# Patient Record
Sex: FEMALE | Race: WHITE | NOT HISPANIC OR LATINO | Employment: FULL TIME | URBAN - METROPOLITAN AREA
[De-identification: names, ages, dates, MRNs, and addresses within clinical notes are randomized per-mention and may not be internally consistent; named-entity substitution may affect disease eponyms.]

---

## 2017-11-30 ENCOUNTER — OFFICE VISIT (OUTPATIENT)
Dept: OBSTETRICS AND GYNECOLOGY | Facility: CLINIC | Age: 37
End: 2017-11-30
Payer: COMMERCIAL

## 2017-11-30 ENCOUNTER — LAB VISIT (OUTPATIENT)
Dept: LAB | Facility: HOSPITAL | Age: 37
End: 2017-11-30
Attending: OBSTETRICS & GYNECOLOGY
Payer: COMMERCIAL

## 2017-11-30 VITALS
DIASTOLIC BLOOD PRESSURE: 74 MMHG | BODY MASS INDEX: 22.79 KG/M2 | HEIGHT: 70 IN | WEIGHT: 159.19 LBS | SYSTOLIC BLOOD PRESSURE: 122 MMHG

## 2017-11-30 DIAGNOSIS — N92.6 IRREGULAR MENSTRUAL CYCLE: ICD-10-CM

## 2017-11-30 DIAGNOSIS — Z01.419 ENCOUNTER FOR GYNECOLOGICAL EXAMINATION WITHOUT ABNORMAL FINDING: Primary | ICD-10-CM

## 2017-11-30 LAB — PROGEST SERPL-MCNC: 0.3 NG/ML

## 2017-11-30 PROCEDURE — 84144 ASSAY OF PROGESTERONE: CPT

## 2017-11-30 PROCEDURE — 36415 COLL VENOUS BLD VENIPUNCTURE: CPT

## 2017-11-30 PROCEDURE — 82672 ASSAY OF ESTROGEN: CPT

## 2017-11-30 PROCEDURE — 88142 CYTOPATH C/V THIN LAYER: CPT

## 2017-11-30 PROCEDURE — 99999 PR PBB SHADOW E&M-EST. PATIENT-LVL III: CPT | Mod: PBBFAC,,, | Performed by: OBSTETRICS & GYNECOLOGY

## 2017-11-30 PROCEDURE — 83001 ASSAY OF GONADOTROPIN (FSH): CPT

## 2017-11-30 PROCEDURE — 99395 PREV VISIT EST AGE 18-39: CPT | Mod: S$GLB,,, | Performed by: OBSTETRICS & GYNECOLOGY

## 2017-11-30 RX ORDER — NORETHINDRONE ACETATE AND ETHINYL ESTRADIOL .02; 1 MG/1; MG/1
1 TABLET ORAL DAILY
Qty: 30 TABLET | Refills: 11 | Status: SHIPPED | OUTPATIENT
Start: 2017-11-30 | End: 2018-11-30

## 2017-11-30 RX ORDER — MELOXICAM 7.5 MG/1
TABLET ORAL
COMMUNITY
Start: 2017-11-29

## 2017-11-30 RX ORDER — SODIUM, POTASSIUM,MAG SULFATES 17.5-3.13G
SOLUTION, RECONSTITUTED, ORAL ORAL
COMMUNITY
Start: 2017-11-10

## 2017-11-30 RX ORDER — CYCLOBENZAPRINE HCL 5 MG
TABLET ORAL
Refills: 0 | COMMUNITY
Start: 2017-10-31

## 2017-11-30 NOTE — PROGRESS NOTES
"CC: Well woman exam    Flores Adair is a 37 y.o. female  presents for a well woman exam.  LMP: Patient's last menstrual period was 10/13/2017..  C/o missed period since October. S/p   & cycles have been q16-18 days. Also c/o hot flushes, irritability although still has daily cramps requiring ibuprofen & tylenol. Sister menopausal late 40s. (+) increased stress as 39 yo  underwent treatment for stage 2 colon cancer. Was on Loestrin in past x 20y. Scheduled for colonoscopy  & concerned about starting cycle  *normal TSH labs OLOL 10/31/17    No past medical history on file.  No past surgical history on file.  Social History     Social History    Marital status:      Spouse name: N/A    Number of children: N/A    Years of education: N/A     Social History Main Topics    Smoking status: Never Smoker    Smokeless tobacco: Never Used    Alcohol use No    Drug use: No    Sexual activity: Yes     Partners: Male     Birth control/ protection: None     Other Topics Concern    None     Social History Narrative    None     Family History   Problem Relation Age of Onset    Lung cancer Paternal Grandfather     Diabetes Father     Heart disease Mother      OB History      Para Term  AB Living    2 2 2     2    SAB TAB Ectopic Multiple Live Births          0 2          Current Outpatient Prescriptions:     cyclobenzaprine (FLEXERIL) 5 MG tablet, TAKE 1 TABLET BY MOUTH 3 (THREE) TIMES DAILY AS NEEDED FOR UP TO 10 DAYS., Disp: , Rfl: 0    meloxicam (MOBIC) 7.5 MG tablet, TAKE 1 TABLET BY MOUTH DAILY., Disp: , Rfl:     sodium,potassium,mag sulfates 17.5-3.13-1.6 gram SolR, USE AS DIRECTED, Disp: , Rfl:     GYNECOLOGY HISTORY:  No abnormal pap/std    DATA REVIEWED:  Last pap:  Results: normal    /74   Ht 5' 9.5" (1.765 m)   Wt 72.2 kg (159 lb 2.8 oz)   LMP 10/13/2017   BMI 23.17 kg/m²     ROS:  GENERAL: Denies weight gain or weight loss. " Feeling well overall.   SKIN: Denies rash or lesions.   HEAD: Denies head injury or headache.   NODES: Denies enlarged lymph nodes.   CHEST: Denies chest pain or shortness of breath.   CARDIOVASCULAR: Denies palpitations or left sided chest pain.   ABDOMEN: No abdominal pain, constipation, diarrhea, nausea, vomiting or rectal bleeding.   URINARY: No frequency, dysuria, hematuria, or burning on urination.  REPRODUCTIVE: See HPI.   BREASTS: The patient denies pain, lumps, or nipple discharge.   HEMATOLOGIC: No easy bruisability or excessive bleeding.   MUSCULOSKELETAL: Denies joint pain or swelling.   NEUROLOGIC: Denies syncope or weakness.   PSYCHIATRIC: Denies depression, anxiety or mood swings.    PHYSICAL EXAM:    APPEARANCE: Well nourished, well developed, in no acute distress.  AFFECT: WNL, alert and oriented x 3  SKIN: No acne or hirsutism  NECK: Neck symmetric without masses or thyromegaly  NODES: No inguinal, cervical, axillary, or femoral lymph node enlargement  CHEST: Good respiratory effect  ABDOMEN: Soft.  No tenderness or masses.  No hepatosplenomegaly.  No hernias.  BREASTS: Symmetrical, no skin changes or visible lesions.  No palpable masses, nipple discharge bilaterally.  PELVIC: Normal external genitalia without lesions.  Normal hair distribution.  Adequate perineal body, normal urethral meatus.  Vagina moist and well rugated without lesions or discharge.  Cervix pink, without lesions, discharge or tenderness.  No significant cystocele or rectocele.  Bimanual exam shows uterus to be normal size, regular, mobile and nontender.  Adnexa without masses or tenderness.    EXTREMITIES: No edema.    Encounter for gynecological examination without abnormal finding    FSH/progesterone/estrogen  microgestin 1/20  Liquid pap smear    Patient was counseled today on A.C.S. Pap guidelines (Q3) and recommendations for yearly pelvic exams, yearly mammograms starting age 40, and clinical breast exams; to see her PCP  for other health maintenance.

## 2017-12-01 ENCOUNTER — PATIENT MESSAGE (OUTPATIENT)
Dept: OBSTETRICS AND GYNECOLOGY | Facility: CLINIC | Age: 37
End: 2017-12-01

## 2017-12-01 LAB — FSH SERPL-ACNC: 126.3 MIU/ML

## 2017-12-06 ENCOUNTER — PATIENT MESSAGE (OUTPATIENT)
Dept: OBSTETRICS AND GYNECOLOGY | Facility: CLINIC | Age: 37
End: 2017-12-06

## 2017-12-07 LAB — ESTROGEN SERPL-MCNC: 67 PG/ML

## 2025-02-18 NOTE — PROGRESS NOTES
[address] [facility] [place] [address]   Phone [phone]  Fax [phone]           CHIEF COMPLAINT: Pain of the Left Foot       HISTORY OF PRESENT ILLNESS (JN) (02/20/2025):      Flores presents for of ongoing left foot and ankle issues that began approximately 8 years ago with ligament tears and an ankle sprain. The injury was severe, limiting foot movement for several weeks. She underwent physical therapy at that time. Two years ago, she developed neuropathy-like symptoms in the affected foot, described as a dull, dead feeling. Flores reports diminished responsiveness in the foot.    She reports weakness in the affected foot, with a tendency for it to drag when walking. The condition has progressively worsened, altering her gait. Flores describes a sensation of leg length discrepancy and the need to consciously lift her toes while walking. The numbness now affects the entire foot, and she is experiencing pain in the high ankle area, particularly when walking. She expresses concern about falling due to foot instability.    Since the initial visit two years ago, she has undergone multiple evaluations and tests. These include ineffective injections for a small tear, two normal nerve conduction studies performed by different neurologists, rheumatologist consultation, spine doctor evaluation with another MRI (ruling out back issues), and extensive testing (T1, T2) to rule out MS.    She reports hypermobility throughout her body and notes that both feet are significantly flat. The left foot is notably weaker than the right, impacting activities like yoga. She is unable to perform single-leg balance on the left foot.    The onset of symptoms is linked to a trip to a [place] where she experienced severe swelling in the affected ankle, almost prompting an ER visit. She reports ongoing ligament pain and difficulty with gait, describing an abnormal knee hinging motion during  "ambulation.    She also mentions new pain along the left side of the foot, describing it as bone-like pain.    Flores denies any history of blood clots.    PREVIOUS TREATMENTS:  - Injection in back by my brother for a small tear: Provided no benefit          PAST MEDICAL HISTORY:    History reviewed. No pertinent past medical history.    Hemoglobin A1C   Date Value Ref Range Status   05/14/2024 5.3 4.2 - 5.8 % Final   01/11/2023 5.4 4.2 - 5.8 % Final   04/28/2022 5.3 4.2 - 5.8 % Final        PAST SURGICAL HISTORY:    History reviewed. No pertinent surgical history.     MEDICATIONS:  Current Medications[1]     There are no discontinued medications.      ALLERGIES:     Review of patient's allergies indicates:  No Known Allergies         FAMILY HISTORY:   Family History   Problem Relation Name Age of Onset    Lung cancer Paternal Grandfather      Diabetes Father      Heart disease Mother             SOCIAL HISTORY:    Social History     Socioeconomic History    Marital status:    Tobacco Use    Smoking status: Never    Smokeless tobacco: Never   Substance and Sexual Activity    Alcohol use: No    Drug use: No    Sexual activity: Yes     Partners: Male     Birth control/protection: None       PHYSICAL EXAMINATION:     Estimated body mass index is 23.17 kg/m² as calculated from the following:    Height as of this encounter: 5' 9.5" (1.765 m).    Weight as of this encounter: 72.2 kg (159 lb 2.8 oz).   ASSISTIVE DEVICE:  None  MUSCULOSKELETAL:    Ankle Exam (affected extremity):   Inspection:         Gross deformity   (+)  pes planovalgus deformity        Erythema   (-)        Ecchymosis   (-)          Swelling   (-)           Open wounds   (-)         Surgical scars   (-)                    Standing alignment:  Standing pes planovalgus   Palpation tenderness:  Bony:  Hindfoot:  Proximal fibula  (-)  Calcaneus  (-)   Distal fibula  (-)  Talus   (-)   Medial malleolus  (+)  CC joint/cuboid  (-)   Tibiotalar " joint  (-)  Lateral gutter  (-)  Plantar fascia  (-)  Medial gutter  (-)   Midfoot:   TN joint   (-)   NC joints   (-)   TMT joints   (-)   Forefoot:   (-)      Soft tissue:     Tendons:    Achilles midsubstance (-)  Peroneal tendons  (-)   Achilles insertion  (-)  Posterior tibial tendon (-)   Retrocalcaneal bursa (-)  Anterior tibial tendon  (-)   Ligaments:   ATFL   (+)  Deltoid   (+)   CFL   (+)  Syndesmosis  (+)  ROM:  Affected Ankle:         DF:    15°        PF:    45°                 Pain    (-)       Crepitance   (-)       Sensory:  Normal sensation to light touch in Sa/Sandoval/DP/SP/T nerve distributions.  She says that her left foot feels the same as her right foot to touch but she says deep down it feels different.      Motor:               Fires EHL/FHL/Tibialis anterior/Gastrocsoleus.  She does have some weakness with resisted posterior tibial tendon testing  Vascular:  Foot WWP with brisk capillary refill    DP/PT pulses palpable          IMAGING:      X-Ray Foot Complete Left  Narrative: EXAMINATION:  XR ANKLE COMPLETE 3 VIEW LEFT; XR FOOT COMPLETE 3 VIEW LEFT    CLINICAL HISTORY:  Pain in left ankle and joints of left foot    TECHNIQUE:  AP, lateral and oblique views of the left ankle and foot at were performed.    COMPARISON:  None    FINDINGS:  Ankle mortise intact without fracture or dislocation.  Soft tissue edema.  Osseous structures of the foot intact without acute abnormality or significant arthritic change.  No focal soft tissue abnormality.  Impression: As above    Electronically signed by: Savage Omalley MD  Date:    02/20/2025  Time:    11:18  X-Ray Ankle Complete Left  Narrative: EXAMINATION:  XR ANKLE COMPLETE 3 VIEW LEFT; XR FOOT COMPLETE 3 VIEW LEFT    CLINICAL HISTORY:  Pain in left ankle and joints of left foot    TECHNIQUE:  AP, lateral and oblique views of the left ankle and foot at were performed.    COMPARISON:  None    FINDINGS:  Ankle mortise intact without fracture or  dislocation.  Soft tissue edema.  Osseous structures of the foot intact without acute abnormality or significant arthritic change.  No focal soft tissue abnormality.  Impression: As above    Electronically signed by: Savage Omalley MD  Date:    02/20/2025  Time:    11:18           ASSESSMENT: 45 y.o. female  with:   Subjective left foot numbness and weakness with two normal EMGs is done by different providers (Dr. Osmin Gonzalez 11/2024, and Dr. 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  Left sided high ankle sprain?  Minimal response to lumbar spine injection for patient    PLAN:   Data  I reviewed all available outside and old records.   I reviewed her radiographs.  Physical therapy:  Schedule: 1-2 times a week for 6 weeks. Ordered for posterior tibial tendon insufficiency with focus on reducing pain/inflammation and improving strength, ROM, and function.  Home exercise program will also be implemented to maintain and improve upon the progress made during therapies.   DME and weight bearing status:   Lace-up ankle brace to help with her stability  She already has [facility]  Advanced imaging:  MRI left ankle to evaluate her posterior tibial tendon in the high ankle sprain she thinks she might have.  Education:   I had a long discussion with the patient about the causes, treatments, and prognosis/natural history for lumbar radiculopathy.  We discussed effective ways to improve symptoms as well as what types of activities may make the symptoms/prognosis worse. We discussed signs and symptoms and other reasons to return to clinic sooner.   Return to clinic:  After MRI  Images needed at follow-up: Longstanding x-rays  15 minutes were spent sizing, fitting, and educating for durable medical equipment application today.  [id number].      This note was generated with the assistance of ambient listening technology. Verbal consent was obtained by the patient  and accompanying visitor(s) for the recording of patient appointment to facilitate this note. I attest to having reviewed and edited the generated note for accuracy, though some syntax or spelling errors may persist. Please contact the author of this note for any clarification.              Physician Signature: Marianna Pride M.D.       Official Website  Schedule An Appointment            [1]   Current Outpatient Medications:     estradiol 0.05 mg/24 hr td ptsw (VIVELLE-DOT) 0.05 mg/24 hr, Place 1 patch onto the skin twice a week., Disp: , Rfl:     levothyroxine (SYNTHROID) 25 MCG tablet, Take 1 tablet by mouth every morning., Disp: , Rfl:     progesterone (PROMETRIUM) 200 MG capsule, Take 200 mg by mouth., Disp: , Rfl:     cyclobenzaprine (FLEXERIL) 5 MG tablet, TAKE 1 TABLET BY MOUTH 3 (THREE) TIMES DAILY AS NEEDED FOR UP TO 10 DAYS. (Patient not taking: Reported on 2/20/2025), Disp: , Rfl: 0    meloxicam (MOBIC) 7.5 MG tablet, TAKE 1 TABLET BY MOUTH DAILY. (Patient not taking: Reported on 2/20/2025), Disp: , Rfl:     norethindrone-ethinyl estradiol (MICROGESTIN 1/20) 1-20 mg-mcg per tablet, Take 1 tablet by mouth once daily., Disp: 30 tablet, Rfl: 11    sodium,potassium,mag sulfates 17.5-3.13-1.6 gram SolR, USE AS DIRECTED (Patient not taking: Reported on 2/20/2025), Disp: , Rfl:

## 2025-02-19 DIAGNOSIS — M25.572 LEFT ANKLE PAIN, UNSPECIFIED CHRONICITY: Primary | ICD-10-CM

## 2025-02-20 ENCOUNTER — HOSPITAL ENCOUNTER (OUTPATIENT)
Dept: RADIOLOGY | Facility: HOSPITAL | Age: 45
Discharge: HOME | End: 2025-02-20
Attending: ORTHOPAEDIC SURGERY
Payer: COMMERCIAL

## 2025-02-20 ENCOUNTER — OFFICE VISIT (OUTPATIENT)
Dept: ORTHOPEDICS | Facility: CLINIC | Age: 45
End: 2025-02-20
Payer: COMMERCIAL

## 2025-02-20 VITALS — HEIGHT: 70 IN | BODY MASS INDEX: 22.79 KG/M2 | WEIGHT: 159.19 LBS

## 2025-02-20 DIAGNOSIS — M76.822 INSUFFICIENCY OF LEFT POSTERIOR TIBIAL TENDON: Primary | ICD-10-CM

## 2025-02-20 DIAGNOSIS — M25.572 PAIN OF JOINT OF LEFT ANKLE AND FOOT: ICD-10-CM

## 2025-02-20 DIAGNOSIS — M25.572 LEFT ANKLE PAIN, UNSPECIFIED CHRONICITY: ICD-10-CM

## 2025-02-20 PROCEDURE — 73610 X-RAY EXAM OF ANKLE: CPT | Mod: TC,LT

## 2025-02-20 PROCEDURE — 73630 X-RAY EXAM OF FOOT: CPT | Mod: TC,LT

## 2025-02-20 RX ORDER — PROGESTERONE 200 MG/1
200 CAPSULE ORAL
COMMUNITY

## 2025-02-20 RX ORDER — LEVOTHYROXINE SODIUM 25 UG/1
1 TABLET ORAL EVERY MORNING
COMMUNITY
Start: 2024-04-26

## 2025-02-20 RX ORDER — ESTRADIOL 0.05 MG/D
1 FILM, EXTENDED RELEASE TRANSDERMAL
COMMUNITY

## 2025-03-03 NOTE — PROGRESS NOTES
[id number] [facility] [place] [address]   Phone [phone]  Fax [phone]           CHIEF COMPLAINT: Numbness of the Left Foot and Pain of the Left Ankle     HISTORY OF PRESENT ILLNESS JN (03/10/2025):  History of Present Illness    HPI:  Flores presents for MRI review follow-up regarding ongoing leg and ankle issues. She reports an altered gait, stating that her gait is altered and she has to  her foot. She nearly fell recently due to dragging her foot. She describes weakness and tightness extending from the ankle up to the glutes, noting that one side is very tight while the other side is normal. She feels very different on each side when walking.    These symptoms started about two years ago after a trip where her ankle became very swollen. Since then, she has had a gradual onset of dragging her foot. She reports tingling in the left calf at night, which extends up and down the leg. She denies calf pain but mentions numbness and tingling that goes up and down the leg and on top of the foot. She notes pain deep in the ankle, which worsens with certain movements and when pressure is applied.    Flores expresses frustration with persistent symptoms despite normal test results, stating that she knows how she walks and feels it.    She has undergone recent imaging, including leg length films and an MRI. Flores also mentions having autoimmune issues, specifically Hashimoto's disease, and reports having concerning numbers related to that condition.    FAMILY HISTORY:  - Autoimmune issues run in the family          Disclaimer: The history above was obtained through ambient listening technology thus may contain errors.     HISTORY OF PRESENT ILLNESS (JN) (03/10/2025):    Flores presents for of ongoing left foot and ankle issues that began approximately 8 years ago with ligament tears and an ankle sprain. The injury was severe, limiting foot movement for several weeks. She underwent  physical therapy at that time. Two years ago, she developed neuropathy-like symptoms in the affected foot, described as a dull, dead feeling. Flores reports diminished responsiveness in the foot.    She reports weakness in the affected foot, with a tendency for it to drag when walking. The condition has progressively worsened, altering her gait. Flores describes a sensation of leg length discrepancy and the need to consciously lift her toes while walking. The numbness now affects the entire foot, and she is experiencing pain in the high ankle area, particularly when walking. She expresses concern about falling due to foot instability.    Since the initial visit two years ago, she has undergone multiple evaluations and tests. These include ineffective injections for a small tear, two normal nerve conduction studies performed by different neurologists, rheumatologist consultation, spine doctor evaluation with another MRI (ruling out back issues), and extensive testing (T1, T2) to rule out MS.    She reports hypermobility throughout her body and notes that both feet are significantly flat. The left foot is notably weaker than the right, impacting activities like yoga. She is unable to perform single-leg balance on the left foot.    The onset of symptoms is linked to a trip [place] a [place] where she experienced severe swelling in the affected ankle, almost prompting an ER visit. She reports ongoing ligament pain and difficulty with gait, describing an abnormal knee hinging motion during ambulation.    She also mentions new pain along the left side of the foot, describing it as bone-like pain.    Flores denies any history of blood clots.    PREVIOUS TREATMENTS:  - Injection in back by my brother for a small tear: Provided no benefit          PAST MEDICAL HISTORY:    History reviewed. No pertinent past medical history.    Hemoglobin A1C   Date Value Ref Range Status   05/14/2024 5.3 4.2 - 5.8 % Final  "  01/11/2023 5.4 4.2 - 5.8 % Final   04/28/2022 5.3 4.2 - 5.8 % Final        PAST SURGICAL HISTORY:    History reviewed. No pertinent surgical history.     MEDICATIONS:  Current Medications[1]     There are no discontinued medications.      ALLERGIES:     Review of patient's allergies indicates:  No Known Allergies         FAMILY HISTORY:   Family History   Problem Relation Name Age of Onset    Lung cancer Paternal Grandfather      Diabetes Father      Heart disease Mother             SOCIAL HISTORY:    Social History     Socioeconomic History    Marital status:    Tobacco Use    Smoking status: Never    Smokeless tobacco: Never   Substance and Sexual Activity    Alcohol use: No    Drug use: No    Sexual activity: Yes     Partners: Male     Birth control/protection: None       PHYSICAL EXAMINATION:     Estimated body mass index is 23.17 kg/m² as calculated from the following:    Height as of 2/20/25: 5' 9.5" (1.765 m).    Weight as of this encounter: 72.2 kg (159 lb 2.8 oz).   ASSISTIVE DEVICE:  None  MUSCULOSKELETAL:    Ankle Exam (affected extremity):   Inspection:         Gross deformity   (+)  pes planovalgus deformity        Erythema   (-)        Ecchymosis   (-)          Swelling   (-)           Open wounds   (-)         Surgical scars   (-)                    Standing alignment:  Standing pes planovalgus   Palpation tenderness:  Bony:  Hindfoot:  Proximal fibula  (-)  Calcaneus  (-)   Distal fibula  (-)  Talus   (-)   Medial malleolus  (+)  CC joint/cuboid  (-)   Tibiotalar joint  (-)  Lateral gutter  (-)  Plantar fascia  (-)  Medial gutter  (-)   Midfoot:   TN joint   (-)   NC joints   (-)   TMT joints   (-)   Forefoot:   (-)      Soft tissue:     Tendons:    Achilles midsubstance (-)  Peroneal tendons  (-)   Achilles insertion  (-)  Posterior tibial tendon (+) but minimal   Retrocalcaneal bursa (-)  Anterior tibial tendon  (-)   Ligaments:   ATFL   (-)  Deltoid   (+) "   CFL   (+)  Syndesmosis  (+)  ROM:  Affected Ankle:         DF:    15°        PF:    45°                 Pain    (-)       Crepitance   (-)       Sensory:  Normal sensation to light touch in Sa/Sandoval/DP/SP/T nerve distributions.  She says that her left foot feels the same as her right foot to touch but she says deep down it feels different.      Motor:               Fires EHL/FHL/Tibialis anterior/Gastrocsoleus.  She does have some weakness with resisted posterior tibial tendon testing  Vascular:  Foot WWP with brisk capillary refill    DP/PT pulses palpable          IMAGING:      X-Ray Hip to Ankle  Narrative: EXAM: XR HIP TO ANKLE    CLINICAL HISTORY: Posterior tibial tendinitis.    FINDINGS:  The bones are intact.  The joint spaces are preserved.  Symmetric leg length.  Impression:  Negative.    Finalized on: 3/6/2025 8:22 PM By:  TERRANCE Plunkett MD, MD  [facility]# [id number]      2025-03-06 20:24:06.705     [facility]  MRI Ankle Without Contrast Left  Narrative: EXAMINATION:  MRI ANKLE WITHOUT CONTRAST LEFT    CLINICAL HISTORY:  Ankle pain, tendon abnormality suspected, neg xray;Evaluate posterior tibial tendon;  Posterior tibial tendinitis, left leg    TECHNIQUE:  MRI ankle performed per routine protocol without contrast.    COMPARISON:  Prior radiograph    FINDINGS:  Ligaments: Anterior and posterior inferior tibiofibular ligaments are intact.  Anterior talofibular, calcaneofibular and posterior talofibular ligaments are intact.  Deep and superficial components of deltoid ligament are intact.  Spring ligament complex and Lisfranc ligament are intact.    Tendons: Medial ankle flexor, dorsal ankle extensor, and peroneus tendons are intact.  The Achilles tendon is intact.    Bones/joints: 6 mm focus of subcortical edema/cystic change noted within the central talar dome with subtle overlying cartilage fissuring.  Less prevalent changes noted within the in adjacent distal tibia.  Small subcortical cystic changes posterior subtalar  joint.  No significant joint effusion.    Miscellaneous: Plantar fascia demonstrates minimal adjacent edema suggesting very mild plantar fasciitis changes.  Sinus tarsi, and tarsal tunnel are unremarkable.  Impression: 1. No evidence of acute ankle sprain/ligamentous injury  2. Unremarkable posterior tibial tendon.  3. Small focus of edema/subcortical cystic change the talar dome with subtle overlying cartilage fissuring.  Less prevalent findings involving the distal tibia posterior subtalar joint.    Electronically signed by: Savage Omalley MD  Date:    03/06/2025  Time:    09:00           ASSESSMENT: 45 y.o. female  with:   Subjective left foot numbness and weakness with two normal EMGs is done by different providers (Dr. Osmin Gonzalez 11/2024, and Dr. Barclay prior to that)  Bilateral flexible flat foot syndrome with left sided posterior tibial tendon inflammation with MRI of left ankle 3/25 demonstrating: No evidence of acute ankle sprain/ligamentous injury.  Unremarkable posterior tibial tendon.  Small focus of edema/subcortical cystic change the talar dome with subtle overlying cartilage fissuring.  Less prevalent findings involving the distal tibia posterior subtalar joint.  Minimal response to lumbar spine injection for patient  Symmetric leg lengths on longstanding films obtained 03/06/2025    PLAN:   Data  I reviewed all available outside and old records.  I reviewed her longstanding films today.  Physical therapy:  She is in PT at [facility] and weight bearing status:   Lace-up ankle brace to help with her stability  She already has Hokas  Advanced imaging:  We reviewed her MRI today.  The findings are reassuring  Education:   I had a long discussion with the patient about the causes, treatments, and prognosis/natural history for ankle pain.  We discussed effective ways to improve symptoms as well as what types of activities may make the symptoms/prognosis worse. We discussed signs and symptoms and  other reasons to return to clinic sooner.   I explained that I do not think her symptoms are from a tendon or ligamentous injury.  Perhaps muscle strengthening may help her.  Perhaps she should follow up with her PCP to see if there is perhaps a medical cause of her symptoms.  Return to clinic:    PRN        This note was generated with the assistance of ambient listening technology. Verbal consent was obtained by the patient and accompanying visitor(s) for the recording of patient appointment to facilitate this note. I attest to having reviewed and edited the generated note for accuracy, though some syntax or spelling errors may persist. Please contact the author of this note for any clarification.              Physician Signature: Marianna Pride M.D.   Official Website  Schedule An Appointment              [1]   Current Outpatient Medications:     estradiol 0.05 mg/24 hr td ptsw (VIVELLE-DOT) 0.05 mg/24 hr, Place 1 patch onto the skin twice a week., Disp: , Rfl:     levothyroxine (SYNTHROID) 25 MCG tablet, Take 1 tablet by mouth every morning., Disp: , Rfl:     progesterone (PROMETRIUM) 200 MG capsule, Take 200 mg by mouth., Disp: , Rfl:     cyclobenzaprine (FLEXERIL) 5 MG tablet, TAKE 1 TABLET BY MOUTH 3 (THREE) TIMES DAILY AS NEEDED FOR UP TO 10 DAYS. (Patient not taking: Reported on 2/20/2025), Disp: , Rfl: 0    meloxicam (MOBIC) 7.5 MG tablet, TAKE 1 TABLET BY MOUTH DAILY. (Patient not taking: Reported on 2/20/2025), Disp: , Rfl:     norethindrone-ethinyl estradiol (MICROGESTIN 1/20) 1-20 mg-mcg per tablet, Take 1 tablet by mouth once daily., Disp: 30 tablet, Rfl: 11    sodium,potassium,mag sulfates 17.5-3.13-1.6 gram SolR, USE AS DIRECTED (Patient not taking: Reported on 2/20/2025), Disp: , Rfl:

## 2025-03-06 ENCOUNTER — RESULTS FOLLOW-UP (OUTPATIENT)
Dept: ORTHOPEDICS | Facility: CLINIC | Age: 45
End: 2025-03-06

## 2025-03-06 ENCOUNTER — HOSPITAL ENCOUNTER (OUTPATIENT)
Dept: RADIOLOGY | Facility: HOSPITAL | Age: 45
Discharge: HOME | End: 2025-03-06
Attending: ORTHOPAEDIC SURGERY
Payer: COMMERCIAL

## 2025-03-06 DIAGNOSIS — M76.822 INSUFFICIENCY OF LEFT POSTERIOR TIBIAL TENDON: ICD-10-CM

## 2025-03-06 PROCEDURE — 73721 MRI JNT OF LWR EXTRE W/O DYE: CPT | Mod: 26,LT,, | Performed by: RADIOLOGY

## 2025-03-06 PROCEDURE — 77073 BONE LENGTH STUDIES: CPT | Mod: TC

## 2025-03-06 PROCEDURE — 77073 BONE LENGTH STUDIES: CPT | Mod: 26,,, | Performed by: RADIOLOGY

## 2025-03-06 PROCEDURE — 73721 MRI JNT OF LWR EXTRE W/O DYE: CPT | Mod: TC,LT

## 2025-03-07 DIAGNOSIS — M76.822 INSUFFICIENCY OF LEFT POSTERIOR TIBIAL TENDON: Primary | ICD-10-CM

## 2025-03-10 ENCOUNTER — OFFICE VISIT (OUTPATIENT)
Dept: ORTHOPEDICS | Facility: CLINIC | Age: 45
End: 2025-03-10
Payer: COMMERCIAL

## 2025-03-10 VITALS — WEIGHT: 159.19 LBS | BODY MASS INDEX: 23.17 KG/M2

## 2025-03-10 DIAGNOSIS — M76.822 INSUFFICIENCY OF LEFT POSTERIOR TIBIAL TENDON: Primary | ICD-10-CM

## 2025-03-10 PROCEDURE — 99999 PR PBB SHADOW E&M-EST. PATIENT-LVL III: CPT | Mod: PBBFAC,,, | Performed by: ORTHOPAEDIC SURGERY

## 2025-03-10 PROCEDURE — 1159F MED LIST DOCD IN RCRD: CPT | Mod: CPTII,S$GLB,, | Performed by: ORTHOPAEDIC SURGERY

## 2025-03-10 PROCEDURE — 99214 OFFICE O/P EST MOD 30 MIN: CPT | Mod: S$GLB,,, | Performed by: ORTHOPAEDIC SURGERY

## 2025-03-10 PROCEDURE — 3008F BODY MASS INDEX DOCD: CPT | Mod: CPTII,S$GLB,, | Performed by: ORTHOPAEDIC SURGERY

## 2025-04-03 ENCOUNTER — PATIENT MESSAGE (OUTPATIENT)
Dept: ORTHOPEDICS | Facility: CLINIC | Age: 45
End: 2025-04-03
Payer: COMMERCIAL

## 2025-04-03 NOTE — PROGRESS NOTES
[address], [place] [address]   Phone [phone]  Fax [phone]           CHIEF COMPLAINT: Numbness of the Left Foot and Pain of the Left Ankle  HISTORY OF PRESENT ILLNESS JN (04/07/2025):  History of Present Illness    HPI:  Peg presents for a new issue which is forefoot pain. She reports sharp pain localized to the second or third metatarsal area, which worsens with pressure and fast walking. Pain is present whenever she walks, with tenderness throughout the foot. The sharpest pain occurs when pressure is applied to a specific area which is the 2nd and 3rd metatarsal.  Her pain is 8/10.    She started PT (PT), which initially reduced the intensity of numbness and tingling in her foot. She increased her activity level due to her sister's wedding, engaging in fast walking of 8-10,000 steps daily using Hoka shoes. Last Monday, she experienced sudden severe, sharp pain in her forefoot when leaning forward.    During PT, her therapist Daniel noted tenderness on the bone of the fifth metatarsal and advised her to return to the clinician. She indicates the painful areas in the second or third metatarsal region. She typically does not exercise much.    She denies having much pain over the fifth metatarsal area during the current visit.          Disclaimer: The history above was obtained through ambient listening technology thus may contain errors.      HISTORY OF PRESENT ILLNESS JN (03/10/2025):  Flores presents for MRI review follow-up regarding ongoing leg and ankle issues. She reports an altered gait, stating that her gait is altered and she has to  her foot. She nearly fell recently due to dragging her foot. She describes weakness and tightness extending from the ankle up to the glutes, noting that one side is very tight while the other side is normal. She feels very different on each side when walking.    These symptoms started about two years ago after a trip where  her ankle became very swollen. Since then, she has had a gradual onset of dragging her foot. She reports tingling in the left calf at night, which extends up and down the leg. She denies calf pain but mentions numbness and tingling that goes up and down the leg and on top of the foot. She notes pain deep in the ankle, which worsens with certain movements and when pressure is applied.    Flores expresses frustration with persistent symptoms despite normal test results, stating that she knows how she walks and feels it.    She has undergone recent imaging, including leg length films and an MRI. Flores also mentions having autoimmune issues, specifically Hashimoto's disease, and reports having concerning numbers related to that condition.    FAMILY HISTORY:  - Autoimmune issues run in the family          Disclaimer: The history above was obtained through ambient listening technology thus may contain errors.     HISTORY OF PRESENT ILLNESS (JN) (04/08/2025):    Flores presents for of ongoing left foot and ankle issues that began approximately 8 years ago with ligament tears and an ankle sprain. The injury was severe, limiting foot movement for several weeks. She underwent physical therapy at that time. Two years ago, she developed neuropathy-like symptoms in the affected foot, described as a dull, dead feeling. Flores reports diminished responsiveness in the foot.    She reports weakness in the affected foot, with a tendency for it to drag when walking. The condition has progressively worsened, altering her gait. Flores describes a sensation of leg length discrepancy and the need to consciously lift her toes while walking. The numbness now affects the entire foot, and she is experiencing pain in the high ankle area, particularly when walking. She expresses concern about falling due to foot instability.    Since the initial visit two years ago, she has undergone multiple evaluations and tests. These include  ineffective injections for a small tear, two normal nerve conduction studies performed by different neurologists, rheumatologist consultation, spine doctor evaluation with another MRI (ruling out back issues), and extensive testing (T1, T2) to rule out MS.    She reports hypermobility throughout her body and notes that both feet are significantly flat. The left foot is notably weaker than the right, impacting activities like yoga. She is unable to perform single-leg balance on the left foot.    The onset of symptoms is linked to a [place] where she experienced severe swelling in the affected ankle, almost prompting an ER visit. She reports ongoing ligament pain and difficulty with gait, describing an abnormal knee hinging motion during ambulation.    She also mentions new pain along the left side of the foot, describing it as bone-like pain.    Flores denies any history of blood clots.    PREVIOUS TREATMENTS:  - Injection in back by my brother for a small tear: Provided no benefit          PAST MEDICAL HISTORY:    History reviewed. No pertinent past medical history.    Hemoglobin A1C   Date Value Ref Range Status   05/14/2024 5.3 4.2 - 5.8 % Final   01/11/2023 5.4 4.2 - 5.8 % Final   04/28/2022 5.3 4.2 - 5.8 % Final        PAST SURGICAL HISTORY:    History reviewed. No pertinent surgical history.     MEDICATIONS:  Current Medications[1]     There are no discontinued medications.      ALLERGIES:     Review of patient's allergies indicates:  No Known Allergies         FAMILY HISTORY:   Family History   Problem Relation Name Age of Onset    Lung cancer Paternal Grandfather      Diabetes Father      Heart disease Mother             SOCIAL HISTORY:    Social History     Socioeconomic History    Marital status:    Tobacco Use    Smoking status: Never    Smokeless tobacco: Never   Substance and Sexual Activity    Alcohol use: No    Drug use: No    Sexual activity: Yes     Partners: Male      "Birth control/protection: None       PHYSICAL EXAMINATION:     Estimated body mass index is 23.17 kg/m² as calculated from the following:    Height as of 2/20/25: 5' 9.5" (1.765 m).    Weight as of this encounter: 72.2 kg (159 lb 2.8 oz).   ASSISTIVE DEVICE:  Hoka shoes  MUSCULOSKELETAL:    Ankle Exam (left extremity):   Inspection:         Gross deformity   (+)  pes planovalgus deformity        Erythema   (-)        Ecchymosis   (-)          Swelling   (+) mild forefoot           Open wounds   (-)         Surgical scars   (-)                    Standing alignment:  Standing pes planovalgus   Palpation tenderness:  Bony:  Hindfoot:  Proximal fibula  (-)  Calcaneus  (-)   Distal fibula  (-)  Talus   (-)   Medial malleolus  (-)  CC joint/cuboid  (-)   Tibiotalar joint  (-)  Lateral gutter  (-)  Plantar fascia  (-)  Medial gutter  (-)   Midfoot:   TN joint   (-)   NC joints   (-)   TMT joints   (-)   Forefoot:   (+) pain over 2nd and 3rd metatarsal shafts      Soft tissue:     Tendons:    Achilles midsubstance (-)  Peroneal tendons  (-)   Achilles insertion  (-)  Posterior tibial tendon (+) but minimal   Retrocalcaneal bursa (-)  Anterior tibial tendon  (-)   Ligaments:   ATFL   (-)  Deltoid   (+)   CFL   (+)  Syndesmosis  (+)  ROM:  Affected Ankle:         DF:    15°        PF:    45°                 Pain    (-)       Crepitance   (-)       Sensory:  Normal sensation to light touch in Sa/Sandoval/DP/SP/T nerve distributions.  She says that her left foot feels the same as her right foot to touch but she says deep down it feels different.      Motor:               Fires EHL/FHL/Tibialis anterior/Gastrocsoleus.  She does have some weakness with resisted posterior tibial tendon testing  Vascular:  Foot WWP with brisk capillary refill    DP/PT pulses palpable      IMAGING:      X-Ray Foot Complete Left  Narrative: EXAMINATION:  XR FOOT COMPLETE 3 VIEW LEFT    CLINICAL HISTORY:  Pain in left foot    TECHNIQUE:  XR FOOT COMPLETE 3 " VIEW LEFT    COMPARISON:  02/20/2025.    FINDINGS:  No evidence of acute fracture or dislocation.  No radiopaque foreign body seen.  Flattening of the longitudinal arch.  Anterior joint space narrowing at the tibiotalar joint.  Impression: As above.    Electronically signed by: Mika Root  Date:    04/07/2025  Time:    11:24           ASSESSMENT: 45 y.o. female  with:   Overuse injury to left forefoot after increasing mileage in May/April 2025   Subjective left foot numbness and weakness with two normal EMGs is done by different providers (Dr. Osmin Gonzalez 11/2024, and Dr. Barclay prior to that)  Bilateral flexible flat foot syndrome with left sided posterior tibial tendon inflammation with MRI of left ankle 3/25 demonstrating: No evidence of acute ankle sprain/ligamentous injury.  Unremarkable posterior tibial tendon.  Small focus of edema/subcortical cystic change the talar dome with subtle overlying cartilage fissuring.  Less prevalent findings involving the distal tibia posterior subtalar joint.  Minimal response to lumbar spine injection for patient  Symmetric leg lengths on longstanding films obtained 03/06/2025    PLAN:   Data  I reviewed all available outside and old records.  I reviewed her longstanding films today.  Physical therapy:  She is in PT at [facility]   Medications:  Encouraged [facility] to take vitamin-D to help heal her stress reactions  DME and weight bearing status:   Lace-up ankle brace to help with her stability has been prescribed in the past  She already has Hokas  Slow down her activities  Post-op shoe prescribed today. 15 minutes were spent sizing, fitting, and educating for durable medical equipment application today.  [id number].   I think a postop shoe for her would be beneficial to support her foot while she is healing  Advanced imaging:  If she does not improve we could get an MRI of her forefoot to evaluate for stress reaction.  However, an MRI at this point would not change her  treatment which is slowing down.  Referral:  Perhaps she should follow up with her PCP to see if there is perhaps a medical cause of her symptoms.  Education:   I had a long discussion with the patient about the causes, treatments, and prognosis/natural history for stress reactions in forefoot.  We discussed effective ways to improve symptoms as well as what types of activities may make the symptoms/prognosis worse. We discussed signs and symptoms and other reasons to return to clinic sooner.   Return to clinic:    4 weeks with Farida.  No x-rays      This note was generated with the assistance of ambient listening technology thus some errors may exist.  Please contact the author of this note for any clarification.           Physician Signature: Marianna Pride M.D.       Official Website  Schedule An Appointment               [1]   Current Outpatient Medications:     estradiol 0.05 mg/24 hr td ptsw (VIVELLE-DOT) 0.05 mg/24 hr, Place 1 patch onto the skin twice a week., Disp: , Rfl:     levothyroxine (SYNTHROID) 25 MCG tablet, Take 1 tablet by mouth every morning., Disp: , Rfl:     progesterone (PROMETRIUM) 200 MG capsule, Take 200 mg by mouth., Disp: , Rfl:     cyclobenzaprine (FLEXERIL) 5 MG tablet, TAKE 1 TABLET BY MOUTH 3 (THREE) TIMES DAILY AS NEEDED FOR UP TO 10 DAYS. (Patient not taking: Reported on 2/20/2025), Disp: , Rfl: 0    meloxicam (MOBIC) 7.5 MG tablet, TAKE 1 TABLET BY MOUTH DAILY. (Patient not taking: Reported on 2/20/2025), Disp: , Rfl:     norethindrone-ethinyl estradiol (MICROGESTIN 1/20) 1-20 mg-mcg per tablet, Take 1 tablet by mouth once daily., Disp: 30 tablet, Rfl: 11    sodium,potassium,mag sulfates 17.5-3.13-1.6 gram SolR, USE AS DIRECTED (Patient not taking: Reported on 2/20/2025), Disp: , Rfl:

## 2025-04-07 ENCOUNTER — OFFICE VISIT (OUTPATIENT)
Dept: ORTHOPEDICS | Facility: CLINIC | Age: 45
End: 2025-04-07
Payer: COMMERCIAL

## 2025-04-07 ENCOUNTER — HOSPITAL ENCOUNTER (OUTPATIENT)
Dept: RADIOLOGY | Facility: HOSPITAL | Age: 45
Discharge: HOME | End: 2025-04-07
Attending: ORTHOPAEDIC SURGERY
Payer: COMMERCIAL

## 2025-04-07 VITALS — BODY MASS INDEX: 23.17 KG/M2 | WEIGHT: 159.19 LBS

## 2025-04-07 DIAGNOSIS — M79.672 LEFT FOOT PAIN: Primary | ICD-10-CM

## 2025-04-07 DIAGNOSIS — M79.672 LEFT FOOT PAIN: ICD-10-CM

## 2025-04-07 PROCEDURE — 99999 PR PBB SHADOW E&M-EST. PATIENT-LVL III: CPT | Mod: PBBFAC,,, | Performed by: ORTHOPAEDIC SURGERY

## 2025-04-07 PROCEDURE — 1159F MED LIST DOCD IN RCRD: CPT | Mod: CPTII,S$GLB,, | Performed by: ORTHOPAEDIC SURGERY

## 2025-04-07 PROCEDURE — 3008F BODY MASS INDEX DOCD: CPT | Mod: CPTII,S$GLB,, | Performed by: ORTHOPAEDIC SURGERY

## 2025-04-07 PROCEDURE — 99214 OFFICE O/P EST MOD 30 MIN: CPT | Mod: S$GLB,,, | Performed by: ORTHOPAEDIC SURGERY

## 2025-04-07 PROCEDURE — 97760 ORTHOTIC MGMT&TRAING 1ST ENC: CPT | Mod: S$GLB,,, | Performed by: ORTHOPAEDIC SURGERY

## 2025-04-07 PROCEDURE — 73630 X-RAY EXAM OF FOOT: CPT | Mod: 26,LT,, | Performed by: STUDENT IN AN ORGANIZED HEALTH CARE EDUCATION/TRAINING PROGRAM

## 2025-04-07 PROCEDURE — 73630 X-RAY EXAM OF FOOT: CPT | Mod: TC,LT
